# Patient Record
(demographics unavailable — no encounter records)

---

## 2025-01-28 NOTE — DISCUSSION/SUMMARY
[FreeTextEntry1] :  Patient presents with symptoms of mixed urinary incontinence. We discussed possible etiologies of her symptoms including both stress urinary incontinence and overactive bladder. We reviewed management options for both conditions. I recommend further workup of her urinary symptoms with urodynamic testing. We reviewed behavioral and fluid modifications. Written and verbal instructions were provided to her as well in Chinese. She will return to my office for urodynamics and follow up with me to discuss results and management options further. IUGA info on URD and BT provided to her in Chinese.

## 2025-01-28 NOTE — REASON FOR VISIT
[Declined  Services] : Patient was offered free  services in ~his/her~ preferred language and declined services. Patient insisted on family member providing interpretation   [Source: ______] : History obtained from [unfilled] [Questionnaire Received] : Patient questionnaire received [Intake Form Reviewed] : Patient intake form with past medical history, surgical history, family history and social history reviewed today [Urinary Incontinence] : urinary incontinence

## 2025-01-28 NOTE — HISTORY OF PRESENT ILLNESS
[Rectal Prolapse] : no [Unable To Restrain Bowel Movement] : moderate [Feelings Of Urinary Urgency] : moderate [x1] : nocturia once nightly [Urinary Tract Infection] : moderate [] : yes [FreeTextEntry1] :  h/o BTL

## 2025-01-28 NOTE — PHYSICAL EXAM
[Chaperone Present] : A chaperone was present in the examining room during all aspects of the physical examination [28144] : A chaperone was present during the pelvic exam. [Rest Stand Full Reduced ____ degree] : Q-Tip Test: Rest Stand Full Reduced [unfilled] degree [Normal Appearance] : general appearance was normal [Atrophy] : atrophy [Aa ____] : Aa [unfilled] [Ba ____] : Ba [unfilled] [Normal] : no abnormalities [FreeTextEntry1] : General: Well, appearing. Alert and orientated. No acute distress HEENT: Normocephalic, atraumatic and extraocular muscles appear to be intact  Neck: Full range of motion, no obvious lymphadenopathy, deformities, or masses noted  Respiratory: Speaking in full sentences comfortably, normal work of breathing and no cough during visit Musculoskeletal: active full range of motion in extremities  Extremities: No upper extremity edema noted Skin: no obvious rash or skin lesions Neuro: Orientated X 3, speech is fluent, normal rate Psych: Normal mood and affect    [Tenderness] : ~T no ~M abdominal tenderness observed [Distended] : not distended [de-identified] : 1cm lipoma on right labia majora-no solid components

## 2025-03-19 NOTE — HISTORY OF PRESENT ILLNESS
[Rectal Prolapse] : no [Unable To Restrain Bowel Movement] : moderate [Feelings Of Urinary Urgency] : moderate [x1] : nocturia once nightly [Urinary Tract Infection] : moderate [] : yes [FreeTextEntry1] : Amy Hale presents for follow up and discussion of urodynamic test results. She underwent urodynamics which revealed Stress urinary incontinence and ISD. We discussed that although her test was negative for detrusor overactivity, she may still have an overactive bladder. We reviewed her symptoms and management options extensively. She reports dysuria since URD testing, will get urine specimen today to check.  h/o BTL

## 2025-03-19 NOTE — DISCUSSION/SUMMARY
[FreeTextEntry1] :   Urine dipstick negative. Will send culture. Recommend cystex or AZO prn, will f/u results  We reviewed management options for stress urinary incontinence including: observation, pelvic floor exercises, continence devices, periurethral bulking agents, and surgical management. We discussed surgical management options and written information on stress urinary incontinence including management options from IUGA was provided to her in Chinese and reviewed. We reviewed risks and benefits of each procedure. She is unsure how she would like to proceed and will contact my office once she has made a treatment decision.    For her OAB, UUI: She defers treatment at this time. If she opts for treatment, will notify me.

## 2025-03-26 NOTE — PHYSICAL EXAM
[No Acute Distress] : no acute distress [IV] : Mallampati Class: IV [Normal Appearance] : normal appearance [No Neck Mass] : no neck mass [Normal Rate/Rhythm] : normal rate/rhythm [Normal S1, S2] : normal s1, s2 [No Murmurs] : no murmurs [No Resp Distress] : no resp distress [Clear to Auscultation Bilaterally] : clear to auscultation bilaterally [No Abnormalities] : no abnormalities [Normal Gait] : normal gait [No Clubbing] : no clubbing [No Cyanosis] : no cyanosis [No Edema] : no edema [Oriented x3] : oriented x3 [Normal Affect] : normal affect

## 2025-03-26 NOTE — HISTORY OF PRESENT ILLNESS
[Never] : never [TextBox_4] : 75 year old woman with history of allergy- skin since 4/2023.  experiencing hives.   No history of asthma in the past. No history of seasonal allergies. received her pneumovax 2/28 this year, fever for 2 days. following that developed a cough which persisted and was associated with body aches. Continues to cough, has exertional dyspnea. Feels something bothering her in the chest and needs to cough.  Cough is dry.  Cough is improving however continues to cough at night.  She has GERD, denies PND.  Denies wheezing.   takes famotidine.  Eats dinner at 6-7PM. Goes to bed at 9pm.   Had CXR in February which by report showed no consolidation or pleural effusion.  Brandee Oakley [phone] Fax [phone]

## 2025-03-26 NOTE — REVIEW OF SYSTEMS
[Cough] : cough [SOB on Exertion] : sob on exertion [GERD] : gerd [Negative] : Allergy/Immunology [Nasal Congestion] : no nasal congestion [Postnasal Drip] : no postnasal drip

## 2025-03-26 NOTE — CONSULT LETTER
[Dear  ___] : Dear  [unfilled], [Consult Letter:] : I had the pleasure of evaluating your patient, [unfilled]. [Please see my note below.] : Please see my note below. [Consult Closing:] : Thank you very much for allowing me to participate in the care of this patient.  If you have any questions, please do not hesitate to contact me. [Sincerely,] : Sincerely, [FreeTextEntry2] : Sarah Oakley MD Fax: [phone]

## 2025-03-26 NOTE — ASSESSMENT
[FreeTextEntry1] : 75 year old woman with history of COVID in 2023, reports SOB since that time on exertion.  Also with cough since FEbruary which she dates to having fever and myalgias after pneumococcal vaccination. Denies history of asthma or allergies but has GERD and is on famotidine. PE today is unremarkable except for crowded oropharynx.  No PFTs performed today She is on DUlera twice daily which she reports has helped.  IMpression;  Cough could be post viral, or related to upper airway cough syndrome as she has GERD.  Doubt ILD as lungs are clear.  Doubt asthma as it is unusual to present at this age.  Plan: Complete PFT. Advised to continue DUlera but to hold on day of PFT fluticasone nasal spray twice daily continue famotidine, advised to not eat after dinner and to lie down 3 hours after evening meal. F/U with me at time of PFT.

## 2025-05-21 NOTE — ASSESSMENT
[FreeTextEntry1] : 75 year old woman with history of COVID in 2023, reports SOB since that time on exertion.  Also with cough since FEbruary which she dates to having fever and myalgias after pneumococcal vaccination. Denies history of asthma or allergies but has GERD and is on famotidine. She improved with treatment of UACS. Has not used DUlera since seeing me in March. Has stopped fluticasone and famotidine. Reports exertional dyspnea with chest pressure today while walking up hill.  PE today is unremarkable except for crowded oropharynx. PFT today is within normal limits.  IMpression;  Cough is likely related to upper airway cough syndrome as she has GERD.  Doubt ILD as lungs are clear.  Today she reports chest pressure associated with dyspnea on walking uphill.  PFTs are normal.  Differential includes deconditioning; we need to rule out cardiac ischemia especially as she is experiencing chest pressure.  Alternatively she may have exercise-induced reflux.  Second issue is that she is snoring but has a crowded oropharynx and is sleepy.  Plan: 1-refer to cardiology for evaluation 2-advised to resume nasal sprays and famotidine if her symptoms recur.  She does not need to use Dulera. 3-HST order to evaluate for sleep apnea

## 2025-05-21 NOTE — CONSULT LETTER
[Dear  ___] : Dear  [unfilled], [Courtesy Letter:] : I had the pleasure of seeing your patient, [unfilled], in my office today. [Consult Closing:] : Thank you very much for allowing me to participate in the care of this patient.  If you have any questions, please do not hesitate to contact me. [Sincerely,] : Sincerely, [FreeTextEntry2] : Brandee Oakley MD

## 2025-05-21 NOTE — END OF VISIT
[FreeTextEntry3] :  Total time spent caring for the patient today was 45 minutes.  This includes time spent before the visit reviewing the chart, during the visit speaking to the patient and performing an examination, personally reviewing chest imaging and PFTs and time spent after the visit on documentation. [Time Spent: ___ minutes] : I have spent [unfilled] minutes of time on the encounter which excludes teaching and separately reported services.

## 2025-05-21 NOTE — HISTORY OF PRESENT ILLNESS
[Never] : never [TextBox_4] : 75 year old woman with history of COVID in 2023, reports SOB since that time on exertion.  Also with cough since FEbruary which she dates to having fever and myalgias after pneumococcal vaccination. Denies history of asthma or allergies but has GERD and is on famotidine. I treated her for upper airway cough syndrome and ordered PFTs  Returns for follow up today  She is feeling much better.  Cough has resolved. Continues to experience exertional dyspnea especially when walking uphill and also experiences Chest pressure at those times. Also reports snoring and hypersomnolence.  Had CXR in February which by report showed no consolidation or pleural effusion.  PCP: Brandee Oakley [phone] Fax [phone]